# Patient Record
Sex: FEMALE | URBAN - METROPOLITAN AREA
[De-identification: names, ages, dates, MRNs, and addresses within clinical notes are randomized per-mention and may not be internally consistent; named-entity substitution may affect disease eponyms.]

---

## 2022-05-04 ENCOUNTER — LAB REQUISITION (OUTPATIENT)
Dept: LAB | Facility: CLINIC | Age: 27
End: 2022-05-04

## 2022-05-04 PROCEDURE — 86481 TB AG RESPONSE T-CELL SUSP: CPT | Performed by: INTERNAL MEDICINE

## 2022-05-06 LAB
GAMMA INTERFERON BACKGROUND BLD IA-ACNC: 0.07 IU/ML
M TB IFN-G BLD-IMP: NEGATIVE
M TB IFN-G CD4+ BCKGRND COR BLD-ACNC: 9.93 IU/ML
MITOGEN IGNF BCKGRD COR BLD-ACNC: 0.1 IU/ML
MITOGEN IGNF BCKGRD COR BLD-ACNC: 0.18 IU/ML
QUANTIFERON MITOGEN: 10 IU/ML
QUANTIFERON NIL TUBE: 0.07 IU/ML
QUANTIFERON TB1 TUBE: 0.25 IU/ML
QUANTIFERON TB2 TUBE: 0.17

## 2023-08-24 ENCOUNTER — HOSPITAL ENCOUNTER (EMERGENCY)
Facility: CLINIC | Age: 28
Discharge: HOME | End: 2023-08-25
Attending: EMERGENCY MEDICINE | Admitting: EMERGENCY MEDICINE

## 2023-08-24 VITALS
DIASTOLIC BLOOD PRESSURE: 90 MMHG | SYSTOLIC BLOOD PRESSURE: 126 MMHG | OXYGEN SATURATION: 100 % | RESPIRATION RATE: 16 BRPM | TEMPERATURE: 98.2 F | HEART RATE: 96 BPM

## 2023-08-24 DIAGNOSIS — R05.9 COUGH, UNSPECIFIED TYPE: ICD-10-CM

## 2023-08-24 PROCEDURE — 87637 SARSCOV2&INF A&B&RSV AMP PRB: CPT | Performed by: EMERGENCY MEDICINE

## 2023-08-24 PROCEDURE — 99283 EMERGENCY DEPT VISIT LOW MDM: CPT

## 2023-08-24 ASSESSMENT — ACTIVITIES OF DAILY LIVING (ADL): ADLS_ACUITY_SCORE: 35

## 2023-08-25 LAB
FLUAV RNA SPEC QL NAA+PROBE: NEGATIVE
FLUBV RNA RESP QL NAA+PROBE: NEGATIVE
RSV RNA SPEC NAA+PROBE: NEGATIVE
SARS-COV-2 RNA RESP QL NAA+PROBE: NEGATIVE

## 2023-08-25 NOTE — ED PROVIDER NOTES
History   Chief Complaint:  OTHER     HPI   Nomi Curiel is a 27 year old female who presents with a cough for several days.  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  She denies any active body aches, sore throat or fever.  No history of asthma or smoking.  Denies any chest pain abdominal pain or shortness of breath.  Denies any vomiting or nausea.    Independent Historian:   None - Patient Only      Medications:    No current outpatient medications on file.    Past Medical History:    No past medical history on file.  Past Surgical History:    No past surgical history on file.   Physical Exam   Patient Vitals for the past 24 hrs:   BP Temp Pulse Resp SpO2   08/24/23 2153 (!) 126/90 98.2  F (36.8  C) 96 16 100 %      General: Alert, appears well-developed and well-nourished. Cooperative.     In no acute distress  HEENT:  Head:  Atraumatic  Ears:  External ears are normal  Mouth/Throat:  Oropharynx is without erythema or exudate and mucous membranes are moist.   Eyes:   Conjunctivae normal and EOM are normal. No scleral icterus.  CV:  Normal rate, regular rhythm, normal heart sounds and radial pulses are 2+ and symmetric.  No murmur.  Resp:  Breath sounds are clear bilaterally    Non-labored, no retractions or accessory muscle use  MS:  Normal range of motion. No edema.  Skin:  Warm and dry.  No rash or lesions noted.  Neuro:   Alert. Normal strength.  GCS: 15  Psych: Normal mood and affect.    Emergency Department Course   No results found for this or any previous visit.  Imaging:  No orders to display      Report per radiology    Laboratory:  Labs Ordered and Resulted from Time of ED Arrival to Time of ED Departure   INFLUENZA A/B, RSV, & SARS-COV2 PCR - Normal       Result Value    Influenza A PCR Negative      Influenza B PCR Negative      RSV PCR Negative      SARS CoV2 PCR Negative          Procedures     Emergency  Department Course & Assessments:       Interventions:  Medications - No data to display     Independent Interpretation (X-rays, CTs, rhythm strip):  None    Consultations/Discussion of Management or Tests:  None        Social Determinants of Health affecting care:   None    Disposition:  The patient was discharged to home.     Impression & Plan    CMS Diagnoses: None    Medical Decision Making:  Patient is a 27-year-old female who presents with a cough for several days.  Likely viral syndrome in nature.  No active fever or abnormal vital signs.  Several colleagues at work had been ill as well and diagnosed with COVID and so she was most worried about COVID today.  Thankfully COVID test negative.  No sore throat at this time.  No concern for strep pharyngitis.  Clear lung sounds on examination no indication for chest x-ray is low suspicion for pneumonia.  Follow-up with primary care as needed.  Copious hydration encouraged.  Discharged home.    Diagnosis:    ICD-10-CM    1. Cough, unspecified type  R05.9            Discharge Medications:  There are no discharge medications for this patient.     8/25/2023   Zack Green MD White, Scott, MD  08/25/23 0237

## 2023-10-19 ENCOUNTER — LAB REQUISITION (OUTPATIENT)
Dept: LAB | Facility: CLINIC | Age: 28
End: 2023-10-19
Payer: COMMERCIAL

## 2023-10-19 DIAGNOSIS — Z01.419 ENCOUNTER FOR GYNECOLOGICAL EXAMINATION (GENERAL) (ROUTINE) WITHOUT ABNORMAL FINDINGS: ICD-10-CM

## 2023-10-19 LAB
ERYTHROCYTE [DISTWIDTH] IN BLOOD BY AUTOMATED COUNT: 13 % (ref 10–15)
FERRITIN SERPL-MCNC: 20 NG/ML (ref 6–175)
HBA1C MFR BLD: 5.3 %
HCT VFR BLD AUTO: 39.6 % (ref 35–47)
HGB BLD-MCNC: 12.8 G/DL (ref 11.7–15.7)
MCH RBC QN AUTO: 27.4 PG (ref 26.5–33)
MCHC RBC AUTO-ENTMCNC: 32.3 G/DL (ref 31.5–36.5)
MCV RBC AUTO: 85 FL (ref 78–100)
PLATELET # BLD AUTO: 296 10E3/UL (ref 150–450)
RBC # BLD AUTO: 4.67 10E6/UL (ref 3.8–5.2)
T3 SERPL-MCNC: 120 NG/DL (ref 85–202)
T3FREE SERPL-MCNC: 3 PG/ML (ref 2–4.4)
T4 FREE SERPL-MCNC: 1.14 NG/DL (ref 0.9–1.7)
T4 SERPL-MCNC: 7.6 UG/DL (ref 4.5–11.7)
TSH SERPL DL<=0.005 MIU/L-ACNC: 1.78 UIU/ML (ref 0.3–4.2)
WBC # BLD AUTO: 10.4 10E3/UL (ref 4–11)

## 2023-10-19 PROCEDURE — 86800 THYROGLOBULIN ANTIBODY: CPT | Mod: ORL | Performed by: OBSTETRICS & GYNECOLOGY

## 2023-10-19 PROCEDURE — 84480 ASSAY TRIIODOTHYRONINE (T3): CPT | Mod: ORL | Performed by: OBSTETRICS & GYNECOLOGY

## 2023-10-19 PROCEDURE — 82728 ASSAY OF FERRITIN: CPT | Mod: ORL | Performed by: OBSTETRICS & GYNECOLOGY

## 2023-10-19 PROCEDURE — 84999 UNLISTED CHEMISTRY PROCEDURE: CPT | Mod: ORL | Performed by: OBSTETRICS & GYNECOLOGY

## 2023-10-19 PROCEDURE — 83036 HEMOGLOBIN GLYCOSYLATED A1C: CPT | Mod: ORL | Performed by: OBSTETRICS & GYNECOLOGY

## 2023-10-19 PROCEDURE — 84439 ASSAY OF FREE THYROXINE: CPT | Mod: ORL | Performed by: OBSTETRICS & GYNECOLOGY

## 2023-10-19 PROCEDURE — 85027 COMPLETE CBC AUTOMATED: CPT | Mod: ORL | Performed by: OBSTETRICS & GYNECOLOGY

## 2023-10-19 PROCEDURE — G0145 SCR C/V CYTO,THINLAYER,RESCR: HCPCS | Mod: ORL | Performed by: OBSTETRICS & GYNECOLOGY

## 2023-10-19 PROCEDURE — 84436 ASSAY OF TOTAL THYROXINE: CPT | Mod: ORL | Performed by: OBSTETRICS & GYNECOLOGY

## 2023-10-19 PROCEDURE — 86376 MICROSOMAL ANTIBODY EACH: CPT | Mod: ORL | Performed by: OBSTETRICS & GYNECOLOGY

## 2023-10-19 PROCEDURE — 84481 FREE ASSAY (FT-3): CPT | Mod: ORL | Performed by: OBSTETRICS & GYNECOLOGY

## 2023-10-19 PROCEDURE — 84443 ASSAY THYROID STIM HORMONE: CPT | Mod: ORL | Performed by: OBSTETRICS & GYNECOLOGY

## 2023-10-20 LAB — THYROPEROXIDASE AB SERPL-ACNC: <10 IU/ML

## 2023-10-21 LAB
Lab: NORMAL
PERFORMING LABORATORY: NORMAL
SPECIMEN STATUS: NORMAL
TEST NAME: NORMAL

## 2023-10-22 LAB — MISCELLANEOUS TEST 1 (ARUP): NORMAL

## 2023-10-23 LAB
BKR LAB AP GYN ADEQUACY: NORMAL
BKR LAB AP GYN INTERPRETATION: NORMAL
BKR LAB AP HPV REFLEX: NORMAL
BKR LAB AP LMP: NORMAL
BKR LAB AP PREVIOUS ABNL DX: NORMAL
BKR LAB AP PREVIOUS ABNORMAL: NORMAL
PATH REPORT.COMMENTS IMP SPEC: NORMAL
PATH REPORT.COMMENTS IMP SPEC: NORMAL
PATH REPORT.RELEVANT HX SPEC: NORMAL

## 2024-01-26 ENCOUNTER — LAB REQUISITION (OUTPATIENT)
Dept: LAB | Facility: CLINIC | Age: 29
End: 2024-01-26
Payer: COMMERCIAL

## 2024-01-26 DIAGNOSIS — Z31.41 ENCOUNTER FOR FERTILITY TESTING: ICD-10-CM

## 2024-01-26 LAB
ESTRADIOL SERPL-MCNC: 30 PG/ML
FSH SERPL IRP2-ACNC: 7.2 MIU/ML
LH SERPL-ACNC: 7.8 MIU/ML
MIS SERPL-MCNC: 4.13 NG/ML (ref 0.58–8.1)
PROLACTIN SERPL 3RD IS-MCNC: 18 NG/ML (ref 5–23)

## 2024-01-26 PROCEDURE — 82166 ASSAY ANTI-MULLERIAN HORM: CPT | Mod: ORL | Performed by: OBSTETRICS & GYNECOLOGY

## 2024-01-26 PROCEDURE — 83001 ASSAY OF GONADOTROPIN (FSH): CPT | Mod: ORL | Performed by: OBSTETRICS & GYNECOLOGY

## 2024-01-26 PROCEDURE — 82670 ASSAY OF TOTAL ESTRADIOL: CPT | Mod: ORL | Performed by: OBSTETRICS & GYNECOLOGY

## 2024-01-26 PROCEDURE — 83002 ASSAY OF GONADOTROPIN (LH): CPT | Mod: ORL | Performed by: OBSTETRICS & GYNECOLOGY

## 2024-01-26 PROCEDURE — 84146 ASSAY OF PROLACTIN: CPT | Mod: ORL | Performed by: OBSTETRICS & GYNECOLOGY

## 2024-04-08 PROCEDURE — 88305 TISSUE EXAM BY PATHOLOGIST: CPT | Mod: 26 | Performed by: STUDENT IN AN ORGANIZED HEALTH CARE EDUCATION/TRAINING PROGRAM

## 2024-04-08 PROCEDURE — 88305 TISSUE EXAM BY PATHOLOGIST: CPT | Mod: TC,ORL | Performed by: OBSTETRICS & GYNECOLOGY

## 2024-04-09 ENCOUNTER — LAB REQUISITION (OUTPATIENT)
Dept: LAB | Facility: CLINIC | Age: 29
End: 2024-04-09
Payer: COMMERCIAL

## 2024-04-11 LAB
PATH REPORT.COMMENTS IMP SPEC: NORMAL
PATH REPORT.COMMENTS IMP SPEC: NORMAL
PATH REPORT.FINAL DX SPEC: NORMAL
PATH REPORT.GROSS SPEC: NORMAL
PATH REPORT.MICROSCOPIC SPEC OTHER STN: NORMAL
PATH REPORT.RELEVANT HX SPEC: NORMAL
PHOTO IMAGE: NORMAL

## 2024-04-13 ENCOUNTER — HOSPITAL ENCOUNTER (EMERGENCY)
Facility: CLINIC | Age: 29
Discharge: HOME | End: 2024-04-13
Attending: EMERGENCY MEDICINE | Admitting: EMERGENCY MEDICINE
Payer: COMMERCIAL

## 2024-04-13 VITALS
WEIGHT: 151 LBS | HEART RATE: 81 BPM | RESPIRATION RATE: 18 BRPM | BODY MASS INDEX: 26.75 KG/M2 | SYSTOLIC BLOOD PRESSURE: 152 MMHG | HEIGHT: 63 IN | TEMPERATURE: 97.6 F | DIASTOLIC BLOOD PRESSURE: 98 MMHG

## 2024-04-13 DIAGNOSIS — H74.03 TYMPANOSCLEROSIS OF BOTH EARS: ICD-10-CM

## 2024-04-13 DIAGNOSIS — H66.90 ACUTE OTITIS MEDIA, UNSPECIFIED OTITIS MEDIA TYPE: ICD-10-CM

## 2024-04-13 PROCEDURE — 99283 EMERGENCY DEPT VISIT LOW MDM: CPT

## 2024-04-13 ASSESSMENT — COLUMBIA-SUICIDE SEVERITY RATING SCALE - C-SSRS
6. HAVE YOU EVER DONE ANYTHING, STARTED TO DO ANYTHING, OR PREPARED TO DO ANYTHING TO END YOUR LIFE?: NO
2. HAVE YOU ACTUALLY HAD ANY THOUGHTS OF KILLING YOURSELF IN THE PAST MONTH?: NO
1. IN THE PAST MONTH, HAVE YOU WISHED YOU WERE DEAD OR WISHED YOU COULD GO TO SLEEP AND NOT WAKE UP?: NO

## 2024-04-13 ASSESSMENT — ACTIVITIES OF DAILY LIVING (ADL): ADLS_ACUITY_SCORE: 33

## 2024-04-13 NOTE — DISCHARGE INSTRUCTIONS
Take Augmentin 1 tablet twice a day for 10 days  Make an appointment with the Dr. Christiano Kulkarni from ear nose and throat for consultation regarding your tympanosclerosis and subjective mild hearing impairment.  You might be a candidate for surgical intervention

## 2024-04-13 NOTE — ED PROVIDER NOTES
"  History     Chief Complaint:  Ear Drainage       The history is provided by the patient.      Isiah Thibodeaux is a 28 year old female presenting to the ED for evaluation of ear pain and drainage. The patient reports that she woke up with left ear pain and had bloody drainage. She has a long history of chronic ear infections. It has been a long time since the last tube was put in.  She notes that she has had at least 2 sets of pressure equalization tubes in both ears in the past.  She denies sore throat, headache, rhinorrhea, and cough. Note, the patient says she had laparoscopic left ovarian cystectomy last week. She says they have never put a patch over her ear drum, and she has some limited hearing in both ears.     Independent Historian:   None    Review of External Notes:  None    Allergies:  No Known Allergies     Listed Medications:    The patient is currently on no regular medications.    Past Medical and Surgical History:    History reviewed. No pertinent past medical history.  History reviewed. No pertinent surgical history.     Family History:    family history is not on file.    Social History:  The patient reports that she has never smoked. She has never used smokeless tobacco. She reports that she does not currently use alcohol. She reports that she does not use drugs.  The patient is here with her  at this time.  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    Physical Exam   Patient Vitals for the past 24 hrs:   BP Temp Temp src Pulse Resp Height Weight   04/13/24 0630 (!) 152/98 97.6  F (36.4  C) Temporal 81 18 1.6 m (5' 3\") 68.5 kg (151 lb)        General: Resting comfortably.   Head:  The scalp, face, and head appear normal  Eyes:  The pupils are equal, round, and reactive to light    There is no nystagmus    Extraocular muscles are intact    Conjunctivae and sclerae are normal  ENT:    The nose is normal    Pinnae are normal    The oropharynx is normal    There is bilateral " tympanosclerosis and features consistent with old inferior tympanic membrane defects from PE tubes, likely with incomplete healing.   There is a slight discharge noted in the left ear.   Neck:  Normal range of motion    There is no rigidity noted  Skin:  No rash or acute skin lesions noted  Neuro:  Speech is normal and fluent, there is no aphasia    No motor deficits  Psych: Awake. Alert.      Normal affect.  Appropriate interactions.      Emergency Department Course     Procedures:  Procedures   None    Emergency Department Course & Assessments:     Interventions/ED Medications:  Medications - No data to display     Independent Interpretation of Radiology Studies by Dr. Zamora  None    Assessments/Consultations/Discussion of Management:  ED Course as of 04/13/24 0748   Sat Apr 13, 2024   0702 I obtained history and examined the patient as noted above.       Disposition:  The patient was discharged.     Impression & Plan        Medical Decision Making:    This patient presents to the emergency department with a history of bilateral chronic ear infections status post PE tubes x 2 in both ears.  She had some drainage from her left ear overnight.  The patient does have a significant tympanosclerosis from prior infections and interventions.  She does appear to have small circular defects involving the central tympanic membrane that may be not entirely healed from prior interventions.  She does note that the hearing in both of her ears is chronically a little bit on the low end.  Audiology testing and ENT follow-up might be beneficial to see if the patient could benefit from any kind of procedures to improve her hearing.  Although it does not appear that there are significant deep ongoing perforations there does appear to be a step-off or defect in the inferior tympanic membranes that may represent incomplete healing.  ENT consultation is warranted.  No blood or obvious suppurative discharge is noted.  There is no evidence  of otitis externa.  I will place the patient on antibiotics and have her consult with ear nose and throat for repeat exam and audiology testing.      Diagnosis:    ICD-10-CM    1. Acute otitis media, unspecified otitis media type  H66.90       2. Tympanosclerosis of both ears  H74.03          Discharge Medications (if applicable):  New Prescriptions    AMOXICILLIN-CLAVULANATE (AUGMENTIN) 875-125 MG TABLET    Take 1 tablet by mouth 2 times daily for 10 days      Scribe Disclosure:  Radha RUVALCABA, am serving as a scribe at 6:56 AM on 4/13/2024 to document services personally performed by Asher Zamora MD based on my observations and the provider's statements to me.     4/13/2024   Asher Zamora MD Rock, Michael P, MD  04/13/24 0752

## 2024-04-13 NOTE — ED TRIAGE NOTES
L ear pain and drainage since 0400     Triage Assessment (Adult)       Row Name 04/13/24 0628          Respiratory WDL    Respiratory WDL WDL        Skin Circulation/Temperature WDL    Skin Circulation/Temperature WDL WDL        Cardiac WDL    Cardiac WDL WDL        Peripheral/Neurovascular WDL    Peripheral Neurovascular WDL WDL        Cognitive/Neuro/Behavioral WDL    Cognitive/Neuro/Behavioral WDL WDL

## 2024-11-12 ENCOUNTER — HOSPITAL ENCOUNTER (EMERGENCY)
Facility: CLINIC | Age: 29
Discharge: HOME | End: 2024-11-13
Attending: EMERGENCY MEDICINE | Admitting: EMERGENCY MEDICINE
Payer: COMMERCIAL

## 2024-11-12 VITALS
BODY MASS INDEX: 25.34 KG/M2 | TEMPERATURE: 98.1 F | HEIGHT: 63 IN | SYSTOLIC BLOOD PRESSURE: 132 MMHG | OXYGEN SATURATION: 100 % | HEART RATE: 87 BPM | DIASTOLIC BLOOD PRESSURE: 87 MMHG | WEIGHT: 143 LBS | RESPIRATION RATE: 18 BRPM

## 2024-11-12 DIAGNOSIS — N30.00 ACUTE CYSTITIS WITHOUT HEMATURIA: ICD-10-CM

## 2024-11-12 LAB
ALBUMIN UR-MCNC: NEGATIVE MG/DL
APPEARANCE UR: CLEAR
BILIRUB UR QL STRIP: NEGATIVE
COLOR UR AUTO: ABNORMAL
GLUCOSE UR STRIP-MCNC: NEGATIVE MG/DL
HCG UR QL: NEGATIVE
HGB UR QL STRIP: NEGATIVE
KETONES UR STRIP-MCNC: NEGATIVE MG/DL
LEUKOCYTE ESTERASE UR QL STRIP: ABNORMAL
NITRATE UR QL: NEGATIVE
PH UR STRIP: 6 [PH] (ref 5–7)
RBC URINE: <1 /HPF
SP GR UR STRIP: 1 (ref 1–1.03)
SQUAMOUS EPITHELIAL: <1 /HPF
UROBILINOGEN UR STRIP-MCNC: NORMAL MG/DL
WBC URINE: 9 /HPF

## 2024-11-12 PROCEDURE — 81001 URINALYSIS AUTO W/SCOPE: CPT | Performed by: EMERGENCY MEDICINE

## 2024-11-12 PROCEDURE — 81025 URINE PREGNANCY TEST: CPT | Performed by: EMERGENCY MEDICINE

## 2024-11-12 PROCEDURE — 87186 SC STD MICRODIL/AGAR DIL: CPT | Performed by: EMERGENCY MEDICINE

## 2024-11-12 PROCEDURE — 99284 EMERGENCY DEPT VISIT MOD MDM: CPT

## 2024-11-12 ASSESSMENT — COLUMBIA-SUICIDE SEVERITY RATING SCALE - C-SSRS
1. IN THE PAST MONTH, HAVE YOU WISHED YOU WERE DEAD OR WISHED YOU COULD GO TO SLEEP AND NOT WAKE UP?: NO
2. HAVE YOU ACTUALLY HAD ANY THOUGHTS OF KILLING YOURSELF IN THE PAST MONTH?: NO
6. HAVE YOU EVER DONE ANYTHING, STARTED TO DO ANYTHING, OR PREPARED TO DO ANYTHING TO END YOUR LIFE?: NO

## 2024-11-13 RX ORDER — NITROFURANTOIN 25; 75 MG/1; MG/1
100 CAPSULE ORAL 2 TIMES DAILY
Qty: 10 CAPSULE | Refills: 0 | Status: SHIPPED | OUTPATIENT
Start: 2024-11-13 | End: 2024-11-18

## 2024-11-13 RX ORDER — FLUCONAZOLE 150 MG/1
TABLET ORAL
Qty: 2 TABLET | Refills: 0 | Status: SHIPPED | OUTPATIENT
Start: 2024-11-13

## 2024-11-13 NOTE — ED TRIAGE NOTES
2 days burning/frequency and bladder spasms     Triage Assessment (Adult)       Row Name 11/12/24 8992          Triage Assessment    Airway WDL WDL        Respiratory WDL    Respiratory WDL WDL        Skin Circulation/Temperature WDL    Skin Circulation/Temperature WDL WDL        Cardiac WDL    Cardiac WDL WDL        Peripheral/Neurovascular WDL    Peripheral Neurovascular WDL WDL        Cognitive/Neuro/Behavioral WDL    Cognitive/Neuro/Behavioral WDL WDL

## 2024-11-13 NOTE — ED PROVIDER NOTES
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  It has been quite sometime since her last UTI, she thinks she had Bactrim in the past.  No history of kidney stones.  Does not think she is pregnant.  No vomiting or fevers.  She suspects that she has a UTI and if so she would like to be on oral antibiotics.  She feels that she is able to continue working clinically in the emergency department tonight, she will get off at 3 AM and will plan to  antibiotics from a nearby pharmacy at that time.  She also states that in the past, antibiotics have caused a yeast infection and therefore she would like to be on prophylactic fluconazole.    Review of External Notes: I personally reviewed prior records but do not see any prior urine cultures.  Saw a local gynecologist in November for ovarian cyst.    Past Medical History     Medical History and Problem List   No past medical history on file.    Medications   No daily medications    Surgical History   No past surgical history on file.  Physical Exam     Patient Vitals for the past 24 hrs:   BP Temp Temp src Pulse Resp SpO2 Height Weight   11/12/24 2321 132/87 98.1  F (36.7  C) Temporal 87 18 100 % 1.6 m (5' 3\") 64.9 kg (143 lb)     Physical Exam  General: Nontoxic-appearing woman  HENT: mucous membranes moist  CV: rate as above  Resp: normal effort, speaks in full phrases, no cough observed  GI: abdomen soft and nontender, no guarding  MSK:   Thoracic spine: nontender, no CVAT  Pelvis stable.  : deferred  Skin: appropriately warm and dry  Neuro: alert, clear speech, oriented, ambulatory with steady gait  Psych: cooperative    Diagnostics   Lab Results   Labs Ordered and Resulted from Time " of ED Arrival to Time of ED Departure   ROUTINE UA WITH MICROSCOPIC REFLEX TO CULTURE - Abnormal       Result Value    Color Urine Straw      Appearance Urine Clear      Glucose Urine Negative      Bilirubin Urine Negative      Ketones Urine Negative      Specific Gravity Urine 1.003      Blood Urine Negative      pH Urine 6.0      Protein Albumin Urine Negative      Urobilinogen Urine Normal      Nitrite Urine Negative      Leukocyte Esterase Urine Trace (*)     RBC Urine <1      WBC Urine 9 (*)     Squamous Epithelials Urine <1     HCG QUALITATIVE URINE - Normal    hCG Urine Qualitative Negative     URINE CULTURE     ED Course      Medications Administered   Medications - No data to display    ED Course and Discussion of Management   ED Course as of 11/13/24 0023   Tue Nov 12, 2024   2328 I went to evaluate patient, she is gone at bathroom per triage RN. I will return for initial eval when patient available.   2333 I evaluated patient, performed hx and physical exam.   Wed Nov 13, 2024   0002 I rechecked patient, discussed test results.       Additional Documentation  None    Medical Decision Making / Diagnosis   Medical Decision Making:  Her symptoms are highly suggestive of uncomplicated cystitis.  Urinalysis is somewhat abnormal, and we agreed to treat with oral antibiotics while awaiting urine culture results that were sent due to the fact that her urinalysis is not completely convincing.  Diflucan also prescribed hopefully prevent the development of yeast infection while taking these antibiotics.  We discussed the possibility of prescribing Bactrim though instead elected to use nitrofurantoin given decreased resistance to E. coli locally.  No signs or symptoms of pyelonephritis nor ureteral stone among other possible complications or explanations for her symptoms so we agreed to defer further workup such as blood tests or imaging.  Vital signs are good.  She is tolerating oral intake and even feels well  enough to continue working which I think is appropriate, she will plan to  these antibiotics in a few hours when she gets off her shift.  Pregnancy test is negative.  Seek reevaluation promptly in the unexpected event of sudden worsening and follow-up through clinic if not improving over the coming days.    Disposition   Discharged    Diagnosis     ICD-10-CM    1. Acute cystitis without hematuria  N30.00            Discharge Medications   New Prescriptions    FLUCONAZOLE (DIFLUCAN) 150 MG TABLET    Take 1 tablet to start, and take one more tablet (another 150mg) 3 days later.    NITROFURANTOIN MACROCRYSTAL-MONOHYDRATE (MACROBID) 100 MG CAPSULE    Take 1 capsule (100 mg) by mouth 2 times daily for 5 days.       11/13/2024   MD Edmond Miller Jeffrey Alan, MD  11/13/24 0027

## 2024-11-14 LAB — BACTERIA UR CULT: ABNORMAL

## 2024-11-15 ENCOUNTER — TELEPHONE (OUTPATIENT)
Dept: NURSING | Facility: CLINIC | Age: 29
End: 2024-11-15
Payer: COMMERCIAL

## 2024-11-15 NOTE — TELEPHONE ENCOUNTER
"[facility]    Reason for call: Lab Result Notification     Lab Result (including Rx patient on, if applicable).  If culture, copy of lab report at bottom.  Lab Result: Urine Culture  11/13/24 Prescribed NitroFURantoin macrocrystal-monohydrate (MACROBID) 100 MG capsule Take 1 capsule (100 mg) by mouth 2 times daily for 5 days. - Oral (SUSCEPTIBLE)    Creatinine Level (mg/dl) No results found for: \"CR\" Creatinine clearance (ml/min), if applicable    Creatinine clearance cannot be calculated (No successful lab value found.)     RN Recommendations/Instructions per [facility] ED lab result protocol:   [facility] ED lab result protocol utilized: Urine Culture    Unable to reach patient/caregiver.   Left voicemail message requesting a call back to [phone] between 9 a.m. and 5:30 p.m. for patient's ED/UC lab results.  Letter pended to be sent via [employer].       Diane Guzman RN  "

## 2024-11-15 NOTE — LETTER
November 15, 2024        Isiah Thibodeaux  [address] [place] [id number]          Dear Isiah Thibodeaux:    You were seen in the [facility] Emergency Department at [facility] on 11/12/2024.  We are unable to reach you by phone, so we are sending you this letter.     It is important that you call [facility] Emergency Department lab result nurse at [phone], as we have information to relay to you AND/OR we MAY have to make some changes in your treatment.    Best time to call back is between 9AM and 5:30PM, 7 days a week.      Sincerely,     [facility] Emergency Department Lab Result RN  [phone]

## 2024-12-15 ENCOUNTER — HEALTH MAINTENANCE LETTER (OUTPATIENT)
Age: 29
End: 2024-12-15

## 2025-01-26 ENCOUNTER — APPOINTMENT (OUTPATIENT)
Dept: CT IMAGING | Facility: CLINIC | Age: 30
End: 2025-01-26
Attending: EMERGENCY MEDICINE
Payer: COMMERCIAL

## 2025-01-26 ENCOUNTER — HOSPITAL ENCOUNTER (EMERGENCY)
Facility: CLINIC | Age: 30
Discharge: HOME | End: 2025-01-26
Attending: EMERGENCY MEDICINE | Admitting: EMERGENCY MEDICINE
Payer: COMMERCIAL

## 2025-01-26 VITALS
HEIGHT: 63 IN | TEMPERATURE: 98.6 F | WEIGHT: 145 LBS | HEART RATE: 91 BPM | RESPIRATION RATE: 20 BRPM | OXYGEN SATURATION: 99 % | SYSTOLIC BLOOD PRESSURE: 136 MMHG | BODY MASS INDEX: 25.69 KG/M2 | DIASTOLIC BLOOD PRESSURE: 86 MMHG

## 2025-01-26 DIAGNOSIS — R07.81 PLEURITIC CHEST PAIN: ICD-10-CM

## 2025-01-26 DIAGNOSIS — K75.81 STEATOHEPATITIS: ICD-10-CM

## 2025-01-26 LAB
ALBUMIN SERPL BCG-MCNC: 4.5 G/DL (ref 3.5–5.2)
ALP SERPL-CCNC: 62 U/L (ref 40–150)
ALT SERPL W P-5'-P-CCNC: 21 U/L (ref 0–50)
ANION GAP SERPL CALCULATED.3IONS-SCNC: 12 MMOL/L (ref 7–15)
AST SERPL W P-5'-P-CCNC: 24 U/L (ref 0–45)
BASOPHILS # BLD AUTO: 0 10E3/UL (ref 0–0.2)
BASOPHILS NFR BLD AUTO: 0 %
BILIRUB SERPL-MCNC: 1.3 MG/DL
BUN SERPL-MCNC: 13.2 MG/DL (ref 6–20)
CALCIUM SERPL-MCNC: 9.4 MG/DL (ref 8.8–10.4)
CHLORIDE SERPL-SCNC: 104 MMOL/L (ref 98–107)
CREAT SERPL-MCNC: 0.81 MG/DL (ref 0.51–0.95)
D DIMER PPP FEU-MCNC: 1.82 UG/ML FEU (ref 0–0.5)
EGFRCR SERPLBLD CKD-EPI 2021: >90 ML/MIN/1.73M2
EOSINOPHIL # BLD AUTO: 0.1 10E3/UL (ref 0–0.7)
EOSINOPHIL NFR BLD AUTO: 1 %
ERYTHROCYTE [DISTWIDTH] IN BLOOD BY AUTOMATED COUNT: 13.1 % (ref 10–15)
GLUCOSE SERPL-MCNC: 96 MG/DL (ref 70–99)
HCO3 SERPL-SCNC: 24 MMOL/L (ref 22–29)
HCT VFR BLD AUTO: 37.1 % (ref 35–47)
HGB BLD-MCNC: 12.3 G/DL (ref 11.7–15.7)
IMM GRANULOCYTES # BLD: 0 10E3/UL
IMM GRANULOCYTES NFR BLD: 0 %
LIPASE SERPL-CCNC: 30 U/L (ref 13–60)
LYMPHOCYTES # BLD AUTO: 3.8 10E3/UL (ref 0.8–5.3)
LYMPHOCYTES NFR BLD AUTO: 33 %
MCH RBC QN AUTO: 27.8 PG (ref 26.5–33)
MCHC RBC AUTO-ENTMCNC: 33.2 G/DL (ref 31.5–36.5)
MCV RBC AUTO: 84 FL (ref 78–100)
MONOCYTES # BLD AUTO: 1 10E3/UL (ref 0–1.3)
MONOCYTES NFR BLD AUTO: 8 %
NEUTROPHILS # BLD AUTO: 6.6 10E3/UL (ref 1.6–8.3)
NEUTROPHILS NFR BLD AUTO: 57 %
NRBC # BLD AUTO: 0 10E3/UL
NRBC BLD AUTO-RTO: 0 /100
PLATELET # BLD AUTO: 282 10E3/UL (ref 150–450)
POTASSIUM SERPL-SCNC: 4.1 MMOL/L (ref 3.4–5.3)
PROT SERPL-MCNC: 7.4 G/DL (ref 6.4–8.3)
RBC # BLD AUTO: 4.43 10E6/UL (ref 3.8–5.2)
SODIUM SERPL-SCNC: 140 MMOL/L (ref 135–145)
WBC # BLD AUTO: 11.5 10E3/UL (ref 4–11)

## 2025-01-26 PROCEDURE — 250N000009 HC RX 250: Performed by: EMERGENCY MEDICINE

## 2025-01-26 PROCEDURE — 71275 CT ANGIOGRAPHY CHEST: CPT

## 2025-01-26 PROCEDURE — 82040 ASSAY OF SERUM ALBUMIN: CPT | Performed by: EMERGENCY MEDICINE

## 2025-01-26 PROCEDURE — 36415 COLL VENOUS BLD VENIPUNCTURE: CPT | Performed by: EMERGENCY MEDICINE

## 2025-01-26 PROCEDURE — 83690 ASSAY OF LIPASE: CPT | Performed by: EMERGENCY MEDICINE

## 2025-01-26 PROCEDURE — 85025 COMPLETE CBC W/AUTO DIFF WBC: CPT | Performed by: EMERGENCY MEDICINE

## 2025-01-26 PROCEDURE — 250N000011 HC RX IP 250 OP 636: Performed by: EMERGENCY MEDICINE

## 2025-01-26 PROCEDURE — 99285 EMERGENCY DEPT VISIT HI MDM: CPT | Mod: 25

## 2025-01-26 PROCEDURE — 85379 FIBRIN DEGRADATION QUANT: CPT | Performed by: EMERGENCY MEDICINE

## 2025-01-26 RX ORDER — IOPAMIDOL 755 MG/ML
59 INJECTION, SOLUTION INTRAVASCULAR ONCE
Status: COMPLETED | OUTPATIENT
Start: 2025-01-26 | End: 2025-01-26

## 2025-01-26 RX ADMIN — IOPAMIDOL 59 ML: 755 INJECTION, SOLUTION INTRAVENOUS at 03:27

## 2025-01-26 RX ADMIN — SODIUM CHLORIDE 88 ML: 9 INJECTION, SOLUTION INTRAVENOUS at 03:27

## 2025-01-26 ASSESSMENT — COLUMBIA-SUICIDE SEVERITY RATING SCALE - C-SSRS
1. IN THE PAST MONTH, HAVE YOU WISHED YOU WERE DEAD OR WISHED YOU COULD GO TO SLEEP AND NOT WAKE UP?: NO
6. HAVE YOU EVER DONE ANYTHING, STARTED TO DO ANYTHING, OR PREPARED TO DO ANYTHING TO END YOUR LIFE?: NO
2. HAVE YOU ACTUALLY HAD ANY THOUGHTS OF KILLING YOURSELF IN THE PAST MONTH?: NO

## 2025-01-26 ASSESSMENT — ACTIVITIES OF DAILY LIVING (ADL): ADLS_ACUITY_SCORE: 41

## 2025-01-26 NOTE — ED PROVIDER NOTES
"  Emergency Department Note      History of Present Illness     Chief Complaint  Chest Pain    HPI  Isiah Thibodeaux is a 29 year old female who presents to the ED for evaluation of chest pain.  The patient works as a nurse in the emergency department.  At work today she noted that she was having pleuritic chest pain.  She primarily felt it in her back.  Occasionally would radiate to the epigastrium.  She is currently on hormones trying to conceive and is a smoker, having recently completed several days ago.  Her symptoms made her concerned for possible pulmonary embolism, prompting her to check in for evaluation she has no personal history of blood clot and is otherwise healthy.  No other complaints.    Independent Historian  None    Review of External Notes  None  Past Medical History   Medical History and Problem List   Endometriosis of pelvis     Medications   Diflucan  Norco  Femara   Prometrium     Physical Exam   Patient Vitals for the past 24 hrs:   BP Temp Temp src Pulse Resp SpO2 Height Weight   01/26/25 0239 -- 98.6  F (37  C) Temporal -- -- -- 1.6 m (5' 3\") 65.8 kg (145 lb)   01/26/25 0221 136/86 -- -- 91 20 99 % -- --     Physical Exam  General: Standing in the emergency department.  Appears somewhat uncomfortable  Head:  The scalp, face, and head appear normal  Mouth/Throat: Mucus membranes are moist  CV:  Regular rate    Normal S1 and S2  No pathological murmur   Resp:  Breath sounds clear and equal bilaterally    Non-labored, no retractions or accessory muscle use    No coarseness    No wheezing   GI:  Abdomen is soft, no rigidity    No tenderness to palpation  MS:  Normal motor assessment of all extremities.    Good capillary refill noted.  Skin:  No rash or lesions noted.  Neuro:   Speech is normal and fluent. No apparent deficit.  Psych: Awake. Alert.  Normal affect.      Appropriate interactions.      Diagnostics   Lab Results   Labs Ordered and Resulted from Time of ED Arrival to Time of ED " Departure   COMPREHENSIVE METABOLIC PANEL - Abnormal       Result Value    Sodium 140      Potassium 4.1      Carbon Dioxide (CO2) 24      Anion Gap 12      Urea Nitrogen 13.2      Creatinine 0.81      GFR Estimate >90      Calcium 9.4      Chloride 104      Glucose 96      Alkaline Phosphatase 62      AST 24      ALT 21      Protein Total 7.4      Albumin 4.5      Bilirubin Total 1.3 (*)    D DIMER QUANTITATIVE - Abnormal    D-Dimer Quantitative 1.82 (*)    CBC WITH PLATELETS AND DIFFERENTIAL - Abnormal    WBC Count 11.5 (*)     RBC Count 4.43      Hemoglobin 12.3      Hematocrit 37.1      MCV 84      MCH 27.8      MCHC 33.2      RDW 13.1      Platelet Count 282      % Neutrophils 57      % Lymphocytes 33      % Monocytes 8      % Eosinophils 1      % Basophils 0      % Immature Granulocytes 0      NRBCs per 100 WBC 0      Absolute Neutrophils 6.6      Absolute Lymphocytes 3.8      Absolute Monocytes 1.0      Absolute Eosinophils 0.1      Absolute Basophils 0.0      Absolute Immature Granulocytes 0.0      Absolute NRBCs 0.0     LIPASE - Normal    Lipase 30         Imaging  CT Chest Pulmonary Embolism w Contrast   Final Result   IMPRESSION:   1.  Negative for pulmonary embolism.   2.  No acute findings in the chest.   3.  Mild hepatic steatosis.           Report per radiology    ED Course    Medications Administered  Medications   iopamidol (ISOVUE-370) solution 59 mL (59 mLs Intravenous $Given 1/26/25 0327)   sodium chloride 0.9 % CT scan flush use (88 mLs Intravenous $Given 1/26/25 0327)        Medical Decision Making / Diagnosis       KATHERIN Thibodeaux is a 29 year old female who presents to the emergency department complaining of pleuritic chest pain.  The patient is an emergency nurse and her symptoms made her concerned for pulmonary embolism.  She presents requesting D-dimer evaluation of her pleuritic pain.  We did discuss cardiac evaluation including EKG and troponin testing.  She respectfully declined  these evaluations noting that she would not have sought care other than to evaluate for pulmonary embolism.  Her physical exam is reassuring, with normal lung sounds throughout.  D-dimer was checked and was elevated therefore CT scan was obtained.  This was thankfully reassuring.  There is no evidence of pneumonia or pulmonary embolism.  No mass.  The upper abdomen was also imaged on the CT which did show mild steatohepatitis.  This was discussed with the patient primary care doctor.  After receiving negative imaging results the patient states that she felt reassured and was comfortable with plan to discharge home.  She is discharged per her request and will follow-up with her primary care doctor.  She will return to the emergency department for any new or worsening symptoms    Disposition  The patient was discharged.     ICD-10 Codes:    ICD-10-CM    1. Pleuritic chest pain  R07.81       2. Steatohepatitis  K75.81               Lilliana Mendez MD  01/26/25 0851

## 2025-01-26 NOTE — ED TRIAGE NOTES
Triage Assessment (Adult)       Row Name 01/26/25 0214          Triage Assessment    Airway WDL WDL        Respiratory WDL    Respiratory WDL rhythm/pattern     Rhythm/Pattern, Respiratory shallow        Cardiac WDL    Cardiac WDL chest pain        Chest Pain Assessment    Chest Pain Location midsternal     Chest Pain Radiation back     Character sharp     Duration 2 hours        Cognitive/Neuro/Behavioral WDL    Cognitive/Neuro/Behavioral WDL WDL

## 2025-01-26 NOTE — DISCHARGE INSTRUCTIONS
Return to the emergency department for worsened chest pain or shortness of breath.  We did not do a cardiac evaluation today, but are happy to if you have additional symptoms or would like further assessment.    If you feel your condition has changed or worsened, please call your doctor or return to the emergency department right away.    Discharge Instructions  Incidental Findings    An incidental finding is something unexpected that was found while you were being treated and is felt to not be related to the reason that you came to the Emergency Department.  While this finding is not an emergency, you need to follow up with your primary provider (or occasionally a specialist) to determine if anything should be done about it.    These findings can come from:  Checking your vital signs (example: high blood pressure).  Taking your history (example: unexplained weight loss).  The physical exam (example: a heart murmur).  Laboratory study (example: anemia or low blood count).  X-rays/ultrasound/CT or other imaging (example: an unexplained mass).    Generally, every Emergency Department visit should have a follow-up clinic visit with either a primary or a specialty clinic/provider. Please follow-up as instructed by your emergency provider today.    Return to the Emergency Department if:  Your condition worsens.  You develop unexpected pain.  You now develop new symptoms or have new concerns.  If you were given a prescription for medicine here today, be sure to read all of the information (including the package insert) that comes with your prescription.  This will include important information about the medicine, its side effects, and any warnings that you need to know about.  The pharmacist who fills the prescription can provide more information and answer questions you may have about the medicine.  If you have questions or concerns that the pharmacist cannot address, please call or return to the Emergency Department.    Remember that you can always come back to the Emergency Department if you are not able to see your regular provider in the amount of time listed above, if you get any new symptoms, or if there is anything that worries you.